# Patient Record
Sex: FEMALE | Race: WHITE | NOT HISPANIC OR LATINO | URBAN - METROPOLITAN AREA
[De-identification: names, ages, dates, MRNs, and addresses within clinical notes are randomized per-mention and may not be internally consistent; named-entity substitution may affect disease eponyms.]

---

## 2023-05-05 ENCOUNTER — HOSPITAL ENCOUNTER (EMERGENCY)
Facility: OTHER | Age: 23
Discharge: HOME | End: 2023-05-05
Attending: EMERGENCY MEDICINE
Payer: COMMERCIAL

## 2023-05-05 VITALS
DIASTOLIC BLOOD PRESSURE: 85 MMHG | BODY MASS INDEX: 21.34 KG/M2 | TEMPERATURE: 98 F | HEIGHT: 64 IN | OXYGEN SATURATION: 98 % | WEIGHT: 125 LBS | HEART RATE: 73 BPM | RESPIRATION RATE: 16 BRPM | SYSTOLIC BLOOD PRESSURE: 141 MMHG

## 2023-05-05 DIAGNOSIS — S69.91XA FINGER INJURY, RIGHT, INITIAL ENCOUNTER: Primary | ICD-10-CM

## 2023-05-05 LAB
B-HCG UR QL: NEGATIVE
CTP QC/QA: YES

## 2023-05-05 PROCEDURE — 81025 URINE PREGNANCY TEST: CPT | Performed by: EMERGENCY MEDICINE

## 2023-05-05 PROCEDURE — 99283 EMERGENCY DEPT VISIT LOW MDM: CPT

## 2023-05-06 NOTE — ED PROVIDER NOTES
Encounter Date: 5/5/2023    SCRIBE #1 NOTE: IDiego, am scribing for, and in the presence of,  Scott Oliveros MD.     History     Chief Complaint   Patient presents with    Hand Pain     Pt has swelling and pain to the right small digit - pt injured her finger on Saturday 4/29/2023 - pt was seen at [facility] on Monday no xray was done - pt was told if pain continued to go to the ed for eval     23 year old female, who presents with complaint of swelling and pain to the right 5th digit onset six days ago. The patient's finger got stuck in the hole of her headboard. She pulled it out at a sideways angle. The swelling, numbness, and color change began thereafter. She states she popped her finger back in place. No blood underneath the nail. Patient was seen at [facility] five days ago. She notes receiving a day time splint and being instructed to bend her finger occasionally. She states the splint was uncomfortable and too big for her finger. Patient took Ibuprofen with minor relief. This is the extent of the patient's complaints at this time.    The history is provided by the patient.   Review of patient's allergies indicates:   Allergen Reactions    Penicillins Rash     itching     No past medical history on file.  No past surgical history on file.  No family history on file.     Review of Systems   Constitutional:  Negative for fever.   HENT:  Negative for congestion.    Eyes:  Negative for redness.   Respiratory:  Negative for shortness of breath.    Cardiovascular:  Negative for chest pain.   Gastrointestinal:  Negative for abdominal pain.   Genitourinary:  Negative for dysuria.   Musculoskeletal:  Positive for joint swelling.   Skin:  Positive for color change. Negative for rash.   Neurological:  Positive for numbness. Negative for headaches.   Psychiatric/Behavioral:  Negative for confusion.      Physical Exam     Initial Vitals [05/05/23 1917]   BP Pulse Resp Temp SpO2   (!)  141/85 73 16 98 °F (36.7 °C) 98 %      MAP       --         Physical Exam    Constitutional: She appears well-developed and well-nourished. She is not diaphoretic. No distress.   HENT:   Head: Normocephalic and atraumatic.   Eyes: Conjunctivae are normal.   Neck: Neck supple.   Cardiovascular:  Normal rate, regular rhythm, S1 normal, S2 normal, normal heart sounds and intact distal pulses.           No murmur heard.  Pulmonary/Chest: Breath sounds normal. No respiratory distress. She has no wheezes. She has no rhonchi. She has no rales.   Musculoskeletal:         General: Tenderness and edema present.      Cervical back: Neck supple.      Comments: Right 5th finger distal phalanx tenderness. Minimal soft tissue edema. ROM pain limited but intact.     Neurological: She is alert and oriented to person, place, and time.   Skin: Skin is warm and dry.   Psychiatric: She has a normal mood and affect.       ED Course   Procedures  Labs Reviewed   POCT URINE PREGNANCY          Imaging Results              X-Ray Finger 2 or More Views Right (Final result)  Result time 05/05/23 21:17:07      Final result by Cee Blank MD (05/05/23 21:17:07)                   Impression:      No acute displaced fracture seen.      Electronically signed by: Cee Blank MD  Date:    05/05/2023  Time:    21:17               Narrative:    EXAMINATION:  XR FINGER 2 OR MORE VIEWS RIGHT    CLINICAL HISTORY:  5th finger pain;    TECHNIQUE:  Right 5th finger three views.    COMPARISON:  None    FINDINGS:  No evidence of acute displaced fracture, dislocation, or osseous destructive process.  No radiopaque retained foreign body seen.                                       Medications - No data to display  Medical Decision Making:   History:   Old Medical Records: I decided to obtain old medical records.  Initial Assessment:       Healthy 23-year-old female presents for evaluation of persistent 5th finger pain.  Initial injury was 6 days ago when  "her finger got stuck and bent abnormally when she tried to remove it, stating that she had to "pop it back in place".  She is had pain to her right 5th distal phalanx with associated tingling and mild swelling since then, was seen by [facility] and placed in finger splint due to suspected fracture but no imaging done then.  She has been keeping the splint on during the day though it has been uncomfortable, but was told she can take it off at night to work on her finger range of motion.  Pain and swelling persisted so she came to ED today for evaluation.  On exam patient with right 5th distal phalanx tenderness and minimal soft tissue edema, sensation to light touch intact with no sign of arterial insufficiency or ecchymosis.  Given reports of possible dislocation, concern for ligament injury, versus avulsion fracture or distal phalanx fracture.    X-ray of finger shows no acute fracture, patient most likely sustained a ligament injury of her DIP joint, possible strain or tear related to described dislocation mechanism.  Patient placed in new finger splint in the ED that she is comfortable and, and advised to keep finger immobilized for the next week and referred to hand clinic for reassessment and further management.  She is advised to continue NSAIDs and return to the ED for any worsening pain or other concerns.      Independently Interpreted Test(s):   I have ordered and independently interpreted X-rays - see prior notes.  Clinical Tests:   Lab Tests: Ordered and Reviewed  Radiological Study: Ordered and Reviewed        Scribe Attestation:   Scribe #1: I performed the above scribed service and the documentation accurately describes the services I performed. I attest to the accuracy of the note.            I, Dr. Scott Oliveros, personally performed the services described in this documentation. All medical record entries made by the scribe were at my direction and in my presence.  I have reviewed the " chart and agree that the record reflects my personal performance and is accurate and complete. Scott Oliveros MD.          Clinical Impression:   Final diagnoses:  [S69.91XA] Finger injury, right, initial encounter (Primary)        ED Disposition Condition    Discharge Stable          ED Prescriptions    None       Follow-up Information       Follow up With Specialties Details Why Contact Info Additional Information    [facility] Schedule an appointment as soon as possible for a visit in 1 week  [address], [address]  [facility] [address] [phone] [facility], [address]th Floor Please park in [place] [address] and use [place] elevators             Scott Oliveros MD  05/06/23 2370